# Patient Record
Sex: FEMALE | NOT HISPANIC OR LATINO | URBAN - METROPOLITAN AREA
[De-identification: names, ages, dates, MRNs, and addresses within clinical notes are randomized per-mention and may not be internally consistent; named-entity substitution may affect disease eponyms.]

---

## 2018-12-17 ENCOUNTER — EMERGENCY (EMERGENCY)
Facility: HOSPITAL | Age: 38
LOS: 1 days | Discharge: HOME | End: 2018-12-17
Attending: EMERGENCY MEDICINE | Admitting: EMERGENCY MEDICINE
Payer: MEDICAID

## 2018-12-17 VITALS
OXYGEN SATURATION: 100 % | TEMPERATURE: 98 F | HEIGHT: 69 IN | HEART RATE: 83 BPM | SYSTOLIC BLOOD PRESSURE: 153 MMHG | RESPIRATION RATE: 15 BRPM | WEIGHT: 149.91 LBS | DIASTOLIC BLOOD PRESSURE: 95 MMHG

## 2018-12-17 LAB
ALBUMIN SERPL ELPH-MCNC: 4.5 G/DL — SIGNIFICANT CHANGE UP (ref 3.3–5)
ALP SERPL-CCNC: 35 U/L — LOW (ref 40–120)
ALT FLD-CCNC: 22 U/L — SIGNIFICANT CHANGE UP (ref 12–78)
ANION GAP SERPL CALC-SCNC: 10 MMOL/L — SIGNIFICANT CHANGE UP (ref 5–17)
AST SERPL-CCNC: 18 U/L — SIGNIFICANT CHANGE UP (ref 15–37)
BASOPHILS # BLD AUTO: 0.03 K/UL — SIGNIFICANT CHANGE UP (ref 0–0.2)
BASOPHILS NFR BLD AUTO: 0.3 % — SIGNIFICANT CHANGE UP (ref 0–2)
BILIRUB SERPL-MCNC: 1 MG/DL — SIGNIFICANT CHANGE UP (ref 0.2–1.2)
BUN SERPL-MCNC: 19 MG/DL — SIGNIFICANT CHANGE UP (ref 7–23)
CALCIUM SERPL-MCNC: 8.6 MG/DL — SIGNIFICANT CHANGE UP (ref 8.5–10.1)
CHLORIDE SERPL-SCNC: 108 MMOL/L — SIGNIFICANT CHANGE UP (ref 96–108)
CO2 SERPL-SCNC: 23 MMOL/L — SIGNIFICANT CHANGE UP (ref 22–31)
CREAT SERPL-MCNC: 0.76 MG/DL — SIGNIFICANT CHANGE UP (ref 0.5–1.3)
EOSINOPHIL # BLD AUTO: 0.03 K/UL — SIGNIFICANT CHANGE UP (ref 0–0.5)
EOSINOPHIL NFR BLD AUTO: 0.3 % — SIGNIFICANT CHANGE UP (ref 0–6)
GLUCOSE SERPL-MCNC: 94 MG/DL — SIGNIFICANT CHANGE UP (ref 70–99)
HCG SERPL-ACNC: <1 MIU/ML — SIGNIFICANT CHANGE UP
HCT VFR BLD CALC: 41 % — SIGNIFICANT CHANGE UP (ref 34.5–45)
HGB BLD-MCNC: 13.8 G/DL — SIGNIFICANT CHANGE UP (ref 11.5–15.5)
IMM GRANULOCYTES NFR BLD AUTO: 0.2 % — SIGNIFICANT CHANGE UP (ref 0–1.5)
LYMPHOCYTES # BLD AUTO: 1.24 K/UL — SIGNIFICANT CHANGE UP (ref 1–3.3)
LYMPHOCYTES # BLD AUTO: 14.1 % — SIGNIFICANT CHANGE UP (ref 13–44)
MCHC RBC-ENTMCNC: 32.5 PG — SIGNIFICANT CHANGE UP (ref 27–34)
MCHC RBC-ENTMCNC: 33.7 GM/DL — SIGNIFICANT CHANGE UP (ref 32–36)
MCV RBC AUTO: 96.7 FL — SIGNIFICANT CHANGE UP (ref 80–100)
MONOCYTES # BLD AUTO: 0.45 K/UL — SIGNIFICANT CHANGE UP (ref 0–0.9)
MONOCYTES NFR BLD AUTO: 5.1 % — SIGNIFICANT CHANGE UP (ref 2–14)
NEUTROPHILS # BLD AUTO: 7 K/UL — SIGNIFICANT CHANGE UP (ref 1.8–7.4)
NEUTROPHILS NFR BLD AUTO: 80 % — HIGH (ref 43–77)
PLATELET # BLD AUTO: 252 K/UL — SIGNIFICANT CHANGE UP (ref 150–400)
POTASSIUM SERPL-MCNC: 3 MMOL/L — LOW (ref 3.5–5.3)
POTASSIUM SERPL-SCNC: 3 MMOL/L — LOW (ref 3.5–5.3)
PROT SERPL-MCNC: 7.9 G/DL — SIGNIFICANT CHANGE UP (ref 6–8.3)
RBC # BLD: 4.24 M/UL — SIGNIFICANT CHANGE UP (ref 3.8–5.2)
RBC # FLD: 12.3 % — SIGNIFICANT CHANGE UP (ref 10.3–14.5)
SODIUM SERPL-SCNC: 141 MMOL/L — SIGNIFICANT CHANGE UP (ref 135–145)
WBC # BLD: 8.77 K/UL — SIGNIFICANT CHANGE UP (ref 3.8–10.5)
WBC # FLD AUTO: 8.77 K/UL — SIGNIFICANT CHANGE UP (ref 3.8–10.5)

## 2018-12-17 PROCEDURE — 96375 TX/PRO/DX INJ NEW DRUG ADDON: CPT

## 2018-12-17 PROCEDURE — 99284 EMERGENCY DEPT VISIT MOD MDM: CPT | Mod: 25

## 2018-12-17 PROCEDURE — 70450 CT HEAD/BRAIN W/O DYE: CPT

## 2018-12-17 PROCEDURE — 84702 CHORIONIC GONADOTROPIN TEST: CPT

## 2018-12-17 PROCEDURE — 70450 CT HEAD/BRAIN W/O DYE: CPT | Mod: 26

## 2018-12-17 PROCEDURE — 96374 THER/PROPH/DIAG INJ IV PUSH: CPT

## 2018-12-17 PROCEDURE — 85027 COMPLETE CBC AUTOMATED: CPT

## 2018-12-17 PROCEDURE — 99285 EMERGENCY DEPT VISIT HI MDM: CPT

## 2018-12-17 PROCEDURE — 36415 COLL VENOUS BLD VENIPUNCTURE: CPT

## 2018-12-17 PROCEDURE — 80053 COMPREHEN METABOLIC PANEL: CPT

## 2018-12-17 RX ORDER — METOCLOPRAMIDE HCL 10 MG
10 TABLET ORAL ONCE
Qty: 0 | Refills: 0 | Status: COMPLETED | OUTPATIENT
Start: 2018-12-17 | End: 2018-12-17

## 2018-12-17 RX ORDER — KETOROLAC TROMETHAMINE 30 MG/ML
30 SYRINGE (ML) INJECTION ONCE
Qty: 0 | Refills: 0 | Status: DISCONTINUED | OUTPATIENT
Start: 2018-12-17 | End: 2018-12-17

## 2018-12-17 RX ORDER — SODIUM CHLORIDE 9 MG/ML
1000 INJECTION INTRAMUSCULAR; INTRAVENOUS; SUBCUTANEOUS ONCE
Qty: 0 | Refills: 0 | Status: COMPLETED | OUTPATIENT
Start: 2018-12-17 | End: 2018-12-17

## 2018-12-17 RX ORDER — POTASSIUM CHLORIDE 20 MEQ
40 PACKET (EA) ORAL ONCE
Qty: 0 | Refills: 0 | Status: COMPLETED | OUTPATIENT
Start: 2018-12-17 | End: 2018-12-17

## 2018-12-17 RX ADMIN — SODIUM CHLORIDE 1000 MILLILITER(S): 9 INJECTION INTRAMUSCULAR; INTRAVENOUS; SUBCUTANEOUS at 18:43

## 2018-12-17 RX ADMIN — Medication 40 MILLIEQUIVALENT(S): at 18:51

## 2018-12-17 RX ADMIN — Medication 10 MILLIGRAM(S): at 18:43

## 2018-12-17 RX ADMIN — Medication 30 MILLIGRAM(S): at 18:43

## 2018-12-17 NOTE — ED ADULT NURSE NOTE - NSIMPLEMENTINTERV_GEN_ALL_ED
Implemented All Universal Safety Interventions:  [place] to call system. Call bell, personal items and telephone within reach. Instruct patient to call for assistance. Room bathroom lighting operational. Non-slip footwear when patient is off stretcher. Physically safe environment: no spills, clutter or unnecessary equipment. Stretcher in lowest position, wheels locked, appropriate side rails in place.

## 2018-12-17 NOTE — ED PROVIDER NOTE - OBJECTIVE STATEMENT
37 yo female with no pmhx c/o pain to right side of head. Patient daily pain x 2 months. also reports swelling to right posterior neck which increases and decreases in size. denies nausea or vomiting. denies visual changes  denies cp or sob  denies abd pain  lmp current

## 2019-07-19 ENCOUNTER — OTHER (OUTPATIENT)
Age: 39
End: 2019-07-19

## 2019-07-19 ENCOUNTER — APPOINTMENT (OUTPATIENT)
Dept: ORTHOPEDIC SURGERY | Facility: CLINIC | Age: 39
End: 2019-07-19
Payer: COMMERCIAL

## 2019-07-19 VITALS
SYSTOLIC BLOOD PRESSURE: 116 MMHG | HEIGHT: 69 IN | HEART RATE: 57 BPM | DIASTOLIC BLOOD PRESSURE: 73 MMHG | WEIGHT: 150 LBS | BODY MASS INDEX: 22.22 KG/M2

## 2019-07-19 PROCEDURE — 99213 OFFICE O/P EST LOW 20 MIN: CPT

## 2019-07-19 PROCEDURE — 73080 X-RAY EXAM OF ELBOW: CPT | Mod: LT

## 2019-07-19 NOTE — ASSESSMENT
[FreeTextEntry1] : Patient with right medial sided elbow pain consistent with medial epicondylitis. The nature of this condition was described at length with the patient she verbalized understanding. I recommended a wrist brace for activities to rest the tendon at the elbow. Meloxicam for an anti-inflammatory. Physical therapy for stretching and gliding exercises. I will send the patient for an EMG to workup her ulnar nerve paresthesias. The patient will follow up in 4-6 weeks should her symptoms not improve and to go over the results of her EMG. The patient is in agreement with this plan.

## 2019-07-19 NOTE — PHYSICAL EXAM
[de-identified] : Right elbow exam\par \par Skin: Clean, dry, intact. No ecchymosis. No swelling. No palpable joint effusion.\par ROM: RIGHT full flexion and extension, full supination/pronation.  LEFT full flexion and extension, full supination/pronation.\par Painful ROM: None\par Tenderness: Mild medial epicondyle pain. No lateral epicondyle pain. No olecranon pain. No pain at radial head. Positive pain at the medial aspect of the elbow with flexion and extension against resistance.\par Strength: 5/5 elbow flexion, 5/5 elbow extension, 5/5 supination, 5/5 pronation\par Stability: Stable to vaus/valgus stress\par Vasc: 2+ radial pulse, <2s cap refill\par Sensation: Intact to light touch throughout\par Neuro: Positive tinels at ulnar canal, AIN/PIN/Ulnar nerve intact to motor/sensation.\par \par  [de-identified] : 3 x-ray views of the right elbow were obtained. No fractures or dislocations are noted.

## 2019-07-19 NOTE — HISTORY OF PRESENT ILLNESS
[FreeTextEntry1] : 07/19/2019: Patient is a 38-year-old right-hand-dominant female who presents the office today with medial sided right elbow pain has been present for the past 6 months. Depending on movements the pain can be done or sharp. It is better with rest and worse with activities. She has tried Advil and Aleve with no improvement. Sometimes the pain radiates down the hands to the fingers. She does complain of some intermittent ulnar nerve distribution paresthesias. There is no specific injury the patient can point to, however, she does go to the gym off in about 3-5 days a week.

## 2019-08-05 ENCOUNTER — APPOINTMENT (OUTPATIENT)
Dept: ORTHOPEDIC SURGERY | Facility: CLINIC | Age: 39
End: 2019-08-05

## 2019-09-16 ENCOUNTER — APPOINTMENT (OUTPATIENT)
Dept: ORTHOPEDIC SURGERY | Facility: CLINIC | Age: 39
End: 2019-09-16
Payer: MEDICAID

## 2019-09-16 DIAGNOSIS — S66.912A STRAIN OF UNSPECIFIED MUSCLE, FASCIA AND TENDON AT WRIST AND HAND LEVEL, LEFT HAND, INITIAL ENCOUNTER: ICD-10-CM

## 2019-09-16 DIAGNOSIS — G56.21 LESION OF ULNAR NERVE, RIGHT UPPER LIMB: ICD-10-CM

## 2019-09-16 PROCEDURE — 73110 X-RAY EXAM OF WRIST: CPT | Mod: LT

## 2019-09-16 PROCEDURE — 99213 OFFICE O/P EST LOW 20 MIN: CPT

## 2019-09-16 PROCEDURE — 73080 X-RAY EXAM OF ELBOW: CPT | Mod: RT

## 2019-09-16 NOTE — ASSESSMENT
[FreeTextEntry1] : the patient is a 38-year-old female with right medial elbow pain, decreased sensation in the middle and ring fingers as well as recent onset left ulnar-sided wrist pain. I recommend MRI and EMG of the right elbow as well as anti-inflammatory medication. She will consider following up with a rheumatologist to rule out systemic inflammatory disorder. She will followup to review the results of the EMG and MRI.

## 2019-09-16 NOTE — PHYSICAL EXAM
[de-identified] : Right elbow exam\par \par Skin: Clean, dry, intact. No ecchymosis. No swelling. No palpable joint effusion.\par ROM: RIGHT full flexion and extension, full supination/pronation.  LEFT full flexion and extension, full supination/pronation.\par Painful ROM: None\par Tenderness: Mild medial epicondyle pain. No lateral epicondyle pain. No olecranon pain. No pain at radial head. Positive pain at the medial aspect of the elbow with flexion and extension against resistance. mild tenderness to palpation at the fovea of the left wrist with mild pain with dart-throwers motion\par Strength: 5/5 elbow flexion, 5/5 elbow extension, 5/5 supination, 5/5 pronation\par Stability: Stable to vaus/valgus stress\par Vasc: 2+ radial pulse, <2s cap refill\par Sensation: Intact to light touch throughout\par Neuro: Positive tinels at cubital tunnel, negative Tinel at the wrist, AIN/PIN/Ulnar nerve intact to motor/sensation.\par \par  [de-identified] : 3 x-ray views of the right elbow were obtained. No fractures or dislocations are noted.

## 2019-09-16 NOTE — HISTORY OF PRESENT ILLNESS
[FreeTextEntry1] : the patient returns today for followup of her right medial elbow pain. She has been going for physical therapy and has had a cortisone injection in the region of the cubital tunnel which did not provide her any relief of her symptoms. She continues to have "altered sensation" in the middle and ring fingers. She denies paresthesias in the small finger. Additionally she complains of a dull pain at the ulnar aspect of the left wrist, she denies trauma or inciting event. Her symptoms were slightly improved with rest.

## 2019-09-19 ENCOUNTER — APPOINTMENT (OUTPATIENT)
Dept: ORTHOPEDIC SURGERY | Facility: CLINIC | Age: 39
End: 2019-09-19
Payer: MEDICAID

## 2019-09-19 DIAGNOSIS — M25.561 PAIN IN RIGHT KNEE: ICD-10-CM

## 2019-09-19 DIAGNOSIS — M23.91 UNSPECIFIED INTERNAL DERANGEMENT OF RIGHT KNEE: ICD-10-CM

## 2019-09-19 PROCEDURE — 73562 X-RAY EXAM OF KNEE 3: CPT | Mod: RT

## 2019-09-19 PROCEDURE — 99214 OFFICE O/P EST MOD 30 MIN: CPT

## 2019-09-19 NOTE — HISTORY OF PRESENT ILLNESS
[de-identified] : 38 year old female presenting with right knee pain. The patient’s pain is noted to be a 4/10. The pain began around 4 months ago. The patient describes their pain as constant and dull. She also notes some clicking. The patient denies pain to other joints. She also lifts weights.She is currently taking no pain medication. No other complaints at this time.\par \par \par

## 2019-09-19 NOTE — PHYSICAL EXAM
[de-identified] : General: Alert and oriented x3. In no acute distress. Pleasant in nature with a normal affect. No apparent respiratory distress.\par R Knee Exam\par Skin: Clean, dry, intact\par Inspection: No obvious malalignment, no masses, no swelling, no effusion\par Pulses: 2+ DP/PT pulses\par ROM: R Knee 0-140 degrees of flexion. No pain with deep knee flexion.\par Tenderness: No MJLT. No LJLT. No pain over the patella facets. No pain to the quadriceps mechanism.\par Stability: Stable to varus, valgus, lachman testing. Negative anterior/posterior drawer.\par Strength: 5/5 Q/H/TA/GS/EHL, no atrophy\par Neuro: In tact to light touch throughout, DTR's normal\par Additional tests: Negative McMurrays test, Negative patellar grind test. [de-identified] : 3V of the right knee were ordered obtained and reviewed by me today, 09/19/2019 , revealed: no fx.\par \par \par

## 2019-09-19 NOTE — ADDENDUM
[FreeTextEntry1] : I, Enmanuel Kelsy, acted solely as a scribe for Dr. Ariel Hunt on this date 09/19/2019 .\par All medical record entries made by the Scribe were at my, Dr. Ariel Hunt, direction and personally dictated by me on 09/19/2019 . I have reviewed the chart and agree that the record accurately reflects my personal performance of the history, physical exam, assessment and plan. I have also personally directed, reviewed, and agreed with the chart.\par \par \par

## 2019-09-19 NOTE — DISCUSSION/SUMMARY
[de-identified] : Today I had a lengthy discussion with the patient regarding their right knee pain.I have addressed all the patient's concerns surrounding the pathology of their condition. I recommend the patient utilize meloxicam and/or a knee sleeve. At this time I would like to obtain advanced imaging of the patient's right knee. An MRI was ordered so I can find out more about the etiology of the patient's condition. The patient should follow up with the office after obtaining the MRI. The patient understood and verbally agreed to the treatment plan. All of their questions were answered and they were satisfied with the visit. The patient should call the office if they have any questions or experience worsening symptoms.\par \par \par

## 2019-09-19 NOTE — CONSULT LETTER
[Consult Letter:] : I had the pleasure of evaluating your patient, [unfilled]. [Please see my note below.] : Please see my note below. [Consult Closing:] : Thank you very much for allowing me to participate in the care of this patient.  If you have any questions, please do not hesitate to contact me. [Sincerely,] : Sincerely, [FreeTextEntry3] : Ariel Hunt, DO\par Foot and Ankle Surgery\par

## 2019-09-24 ENCOUNTER — FORM ENCOUNTER (OUTPATIENT)
Age: 39
End: 2019-09-24

## 2019-09-25 ENCOUNTER — APPOINTMENT (OUTPATIENT)
Dept: MRI IMAGING | Facility: CLINIC | Age: 39
End: 2019-09-25

## 2019-09-25 ENCOUNTER — OUTPATIENT (OUTPATIENT)
Dept: OUTPATIENT SERVICES | Facility: HOSPITAL | Age: 39
LOS: 1 days | End: 2019-09-25
Payer: MEDICAID

## 2019-09-25 DIAGNOSIS — M25.561 PAIN IN RIGHT KNEE: ICD-10-CM

## 2019-09-25 DIAGNOSIS — G56.21 LESION OF ULNAR NERVE, RIGHT UPPER LIMB: ICD-10-CM

## 2019-09-25 DIAGNOSIS — M77.01 MEDIAL EPICONDYLITIS, RIGHT ELBOW: ICD-10-CM

## 2019-09-25 DIAGNOSIS — M23.91 UNSPECIFIED INTERNAL DERANGEMENT OF RIGHT KNEE: ICD-10-CM

## 2019-09-25 PROCEDURE — 73721 MRI JNT OF LWR EXTRE W/O DYE: CPT | Mod: 26,RT

## 2019-09-25 PROCEDURE — 73721 MRI JNT OF LWR EXTRE W/O DYE: CPT

## 2019-09-25 PROCEDURE — 73221 MRI JOINT UPR EXTREM W/O DYE: CPT

## 2019-09-25 PROCEDURE — 73221 MRI JOINT UPR EXTREM W/O DYE: CPT | Mod: 26,RT

## 2019-10-17 ENCOUNTER — APPOINTMENT (OUTPATIENT)
Dept: NEUROLOGY | Facility: CLINIC | Age: 39
End: 2019-10-17
Payer: MEDICAID

## 2019-10-17 DIAGNOSIS — M54.12 RADICULOPATHY, CERVICAL REGION: ICD-10-CM

## 2019-10-17 PROCEDURE — 95910 NRV CNDJ TEST 7-8 STUDIES: CPT

## 2019-10-17 PROCEDURE — 95886 MUSC TEST DONE W/N TEST COMP: CPT

## 2019-10-17 RX ORDER — MELOXICAM 15 MG/1
15 TABLET ORAL
Qty: 30 | Refills: 1 | Status: DISCONTINUED | COMMUNITY
Start: 2019-07-19 | End: 2019-10-17

## 2019-10-17 RX ORDER — METHYLPREDNISOLONE 4 MG/1
4 TABLET ORAL
Qty: 1 | Refills: 0 | Status: DISCONTINUED | COMMUNITY
Start: 2019-09-18 | End: 2019-10-17

## 2019-10-17 NOTE — REASON FOR VISIT
[Procedure: _________] : a [unfilled] procedure visit [FreeTextEntry1] : Referred by Dr. Bueno for EMG/MCV studies of upper extremity for evaluation of ulnar neuropathy

## 2019-10-17 NOTE — PROCEDURE
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 There is no evidence of Ulnar neuropathy at the elbows on either side.\par \par In addition, the studies raise possibility of C5/6 radiculopathy on the right side.

## 2020-03-02 ENCOUNTER — APPOINTMENT (OUTPATIENT)
Dept: ORTHOPEDIC SURGERY | Facility: CLINIC | Age: 40
End: 2020-03-02
Payer: MEDICAID

## 2020-03-02 DIAGNOSIS — M77.01 MEDIAL EPICONDYLITIS, RIGHT ELBOW: ICD-10-CM

## 2020-03-02 DIAGNOSIS — G56.01 CARPAL TUNNEL SYNDROME, RIGHT UPPER LIMB: ICD-10-CM

## 2020-03-02 PROCEDURE — 99213 OFFICE O/P EST LOW 20 MIN: CPT

## 2020-03-02 NOTE — HISTORY OF PRESENT ILLNESS
[FreeTextEntry1] : 9/16/19: the patient returns today for followup of her right medial elbow pain. She has been going for physical therapy and has had a cortisone injection in the region of the cubital tunnel which did not provide her any relief of her symptoms. She continues to have "altered sensation" in the middle and ring fingers. She denies paresthesias in the small finger. Additionally she complains of a dull pain at the ulnar aspect of the left wrist, she denies trauma or inciting event. Her symptoms were slightly improved with rest.\par \par 03/02/2020: The patient returns to the office with continued right medial elbow pain. She also complains of mild intermittent paresthesias in the median nerve distribution still. She notes that her medial sided right elbow pain has been getting worse over the past month or 2. She also notes a small amount of ecchymosis over the medial side of her elbow.

## 2020-03-02 NOTE — PHYSICAL EXAM
Polymyxin B 1 drop every 6 hours to left eye if should start draining mucous from eyes or crusted shut in the morning  No rubbing eyes  Practice good hand hygiene  May apply warm compress to affected eye   Follow up with your Pediatrician if no improvement in symptoms  If any worsening symptoms or change in vision go to the emergency room               [Normal Finger/nose] : finger to nose coordination [Normal] : no peripheral adenopathy appreciated [de-identified] : Right elbow exam\par \par Skin: Clean, dry, intact. No ecchymosis. No swelling. No palpable joint effusion.\par ROM: RIGHT full flexion and extension, full supination/pronation.  LEFT full flexion and extension, full supination/pronation.\par Painful ROM: None\par Tenderness: Mild medial epicondyle pain. No lateral epicondyle pain. No olecranon pain. No pain at radial head. Positive pain at the medial aspect of the elbow with flexion and extension against resistance. mild tenderness to palpation at the fovea of the left wrist with mild pain with dart-throwers motion\par Strength: 5/5 elbow flexion, 5/5 elbow extension, 5/5 supination, 5/5 pronation\par Stability: Stable to vaus/valgus stress\par Vasc: 2+ radial pulse, <2s cap refill\par Sensation: Intact to light touch throughout\par Neuro: negative tinels at cubital tunnel, negative Tinel at the wrist, AIN/PIN/Ulnar nerve intact to motor/sensation.\par \par  [de-identified] : KANAR

## 2020-03-02 NOTE — REVIEW OF SYSTEMS
GI Discharge Instructions Endoscopy      5/15/2019    During your exam, the physician:    Completed a thorough exam, no specimens were obtained.    DIET INSTRUCTIONS:  Resume your regular diet    PRESCRIPTIONS/MEDICATIONS  No new prescriptions given today    A RESPONSIBLE ADULT MUST ACCOMPANY YOU AND DRIVE YOU HOME    You had the following procedure(s) today:   Other: Flexible sigmoidoscopy     There is no need to hold any aspirin or anti-inflammatory medications.     Following sedation, your judgement, perception and coordination are impaired for a minimum of       24 hours.      Therefore:  · Do not drive.  Do not return to work today.  · Do not operate appliances or machinery that require quick reaction time  · Do not sign legal documents or be involved in work decisions  · Do not smoke or drink alcoholic beverages for 24 hours  · Plan to spend a few hours resting before resuming your normal routine    Please call your physician in the event that you experience any of the following or proceed to     the nearest hospital in the event of an emergency:     SIGMOIDOSCOPY  Fever  Severe abdominal distention or pain. Some mild distention and/or cramping are normal after these procedures but should pass within an hour or two with the passage of air.  Rectal bleeding more than blood streaking on the toilet  tissue  Nausea or Vomiting    If you have any questions or concerns, contact Dr. VANNA Beyer, [phone]    ADDITIONAL INSTRUCTIONS:      After flexible sigmoidoscopy you are to report to radiology for a pouchogram.  This is to be preformed so that you are all ready for your surgical appointment.     [Joint Pain] : joint pain [Negative] : Allergic/Immunologic [FreeTextEntry9] : Right elbow pain

## 2020-03-02 NOTE — ASSESSMENT
[FreeTextEntry1] : Patient with continued symptoms of epicondylitis of the right elbow. I will send the patient Toradol for a stronger anti-inflammatory. I also recommend a new MRI as she has had worsening in her symptoms as well as ecchymosis over the medial aspect of her elbow. She will follow back up after the MRI to discuss the results and further treatment options. The patient is in agreement with this plan.

## 2020-03-23 ENCOUNTER — APPOINTMENT (OUTPATIENT)
Dept: ORTHOPEDIC SURGERY | Facility: CLINIC | Age: 40
End: 2020-03-23

## 2021-02-02 ENCOUNTER — APPOINTMENT (OUTPATIENT)
Dept: CARDIOLOGY | Facility: CLINIC | Age: 41
End: 2021-02-02
Payer: MEDICAID

## 2021-02-02 ENCOUNTER — NON-APPOINTMENT (OUTPATIENT)
Age: 41
End: 2021-02-02

## 2021-02-02 VITALS
HEART RATE: 61 BPM | WEIGHT: 154 LBS | HEIGHT: 69 IN | DIASTOLIC BLOOD PRESSURE: 92 MMHG | BODY MASS INDEX: 22.81 KG/M2 | OXYGEN SATURATION: 99 % | SYSTOLIC BLOOD PRESSURE: 143 MMHG

## 2021-02-02 DIAGNOSIS — Z82.49 FAMILY HISTORY OF ISCHEMIC HEART DISEASE AND OTHER DISEASES OF THE CIRCULATORY SYSTEM: ICD-10-CM

## 2021-02-02 DIAGNOSIS — Z78.9 OTHER SPECIFIED HEALTH STATUS: ICD-10-CM

## 2021-02-02 DIAGNOSIS — R00.2 PALPITATIONS: ICD-10-CM

## 2021-02-02 PROCEDURE — 99204 OFFICE O/P NEW MOD 45 MIN: CPT

## 2021-02-02 PROCEDURE — 93000 ELECTROCARDIOGRAM COMPLETE: CPT

## 2021-02-02 PROCEDURE — 99072 ADDL SUPL MATRL&STAF TM PHE: CPT

## 2021-02-02 NOTE — PHYSICAL EXAM
[General Appearance - Well Developed] : well developed [Normal Appearance] : normal appearance [Well Groomed] : well groomed [General Appearance - Well Nourished] : well nourished [General Appearance - In No Acute Distress] : no acute distress [No Deformities] : no deformities [Normal Conjunctiva] : the conjunctiva exhibited no abnormalities [Eyelids - No Xanthelasma] : the eyelids demonstrated no xanthelasmas [Normal Oral Mucosa] : normal oral mucosa [No Oral Pallor] : no oral pallor [No Oral Cyanosis] : no oral cyanosis [Normal Jugular Venous A Waves Present] : normal jugular venous A waves present [Normal Jugular Venous V Waves Present] : normal jugular venous V waves present [No Jugular Venous Hall A Waves] : no jugular venous hall A waves [Normal Rate] : normal [Normal S1] : normal S1 [Normal S2] : normal S2 [S3] : no S3 [S4] : no S4 [No Murmur] : no murmurs heard [Right Carotid Bruit] : no bruit heard over the right carotid [Left Carotid Bruit] : no bruit heard over the left carotid [Left Femoral Bruit] : no bruit heard over the left femoral artery [Right Femoral Bruit] : no bruit heard over the right femoral artery [2+] : left 2+ [No Abnormalities] : the abdominal aorta was not enlarged and no bruit was heard [No Pitting Edema] : no pitting edema present [Respiration, Rhythm And Depth] : normal respiratory rhythm and effort [Exaggerated Use Of Accessory Muscles For Inspiration] : no accessory muscle use [Auscultation Breath Sounds / Voice Sounds] : lungs were clear to auscultation bilaterally [Abdomen Tenderness] : non-tender [Abdomen Soft] : soft [Abdomen Mass (___ Cm)] : no abdominal mass palpated [Abnormal Walk] : normal gait [Gait - Sufficient For Exercise Testing] : the gait was sufficient for exercise testing [Nail Clubbing] : no clubbing of the fingernails [Cyanosis, Localized] : no localized cyanosis [Petechial Hemorrhages (___cm)] : no petechial hemorrhages [Skin Color & Pigmentation] : normal skin color and pigmentation [] : no rash [No Venous Stasis] : no venous stasis [No Skin Ulcers] : no skin ulcer [Skin Lesions] : no skin lesions [No Xanthoma] : no  xanthoma was observed [Oriented To Time, Place, And Person] : oriented to person, place, and time [Affect] : the affect was normal [Mood] : the mood was normal [No Anxiety] : not feeling anxious

## 2021-02-02 NOTE — HISTORY OF PRESENT ILLNESS
[FreeTextEntry1] : Salome is a 40 year old female here for evaluation.\par She is generally healthy. She has a history of psoriasis/arthritis, and is following with a rheumatologist. \par Cardiac wise, she has a history of palpitations, that occur every few months, and resolve within seconds. She is active and lifts without issue.\par She denies chest pain and difficulty breathing.\par She notes intermittent swelling of her hands and lower extremities. Recently, this has been worse on the right side of her body. She eats a lot of salt in her diet.\par There is a family history of CAD/CHF in her mother. She is not a smoker.

## 2021-02-02 NOTE — DISCUSSION/SUMMARY
[FreeTextEntry1] : Salome is a 40 year old female here for evaluation. She reports edema, that has recently resolved. She also has intermittent palpitations over years.\par Her physical exam is unremarkable. Her EKG is a SR without obvious ischemia or chamber enlargement. Her BP is mildly elevated, though improved on repeat evaluation.\par We will start with an echocardiogram to confirm the absence of structural heart disease. She needs to stay active and to go out of her way to limit her salt intake.\par We will speak after the echocardiogram and arrange follow up.

## 2021-02-23 ENCOUNTER — APPOINTMENT (OUTPATIENT)
Dept: CARDIOLOGY | Facility: CLINIC | Age: 41
End: 2021-02-23

## 2021-11-03 ENCOUNTER — NON-APPOINTMENT (OUTPATIENT)
Age: 41
End: 2021-11-03

## 2022-02-08 ENCOUNTER — APPOINTMENT (OUTPATIENT)
Dept: INTERNAL MEDICINE | Facility: CLINIC | Age: 42
End: 2022-02-08
Payer: MEDICAID

## 2022-02-08 ENCOUNTER — NON-APPOINTMENT (OUTPATIENT)
Age: 42
End: 2022-02-08

## 2022-02-08 VITALS
SYSTOLIC BLOOD PRESSURE: 130 MMHG | DIASTOLIC BLOOD PRESSURE: 88 MMHG | HEART RATE: 76 BPM | OXYGEN SATURATION: 98 % | RESPIRATION RATE: 16 BRPM | BODY MASS INDEX: 23.42 KG/M2 | HEIGHT: 69 IN | TEMPERATURE: 98.1 F | WEIGHT: 158.13 LBS

## 2022-02-08 DIAGNOSIS — Z83.3 FAMILY HISTORY OF DIABETES MELLITUS: ICD-10-CM

## 2022-02-08 DIAGNOSIS — Z00.00 ENCOUNTER FOR GENERAL ADULT MEDICAL EXAMINATION W/OUT ABNORMAL FINDINGS: ICD-10-CM

## 2022-02-08 DIAGNOSIS — E63.9 NUTRITIONAL DEFICIENCY, UNSPECIFIED: ICD-10-CM

## 2022-02-08 DIAGNOSIS — Z82.49 FAMILY HISTORY OF ISCHEMIC HEART DISEASE AND OTHER DISEASES OF THE CIRCULATORY SYSTEM: ICD-10-CM

## 2022-02-08 PROCEDURE — G0442 ANNUAL ALCOHOL SCREEN 15 MIN: CPT

## 2022-02-08 PROCEDURE — 99386 PREV VISIT NEW AGE 40-64: CPT | Mod: 25

## 2022-02-08 PROCEDURE — 93000 ELECTROCARDIOGRAM COMPLETE: CPT | Mod: 59

## 2022-02-08 RX ORDER — KETOROLAC TROMETHAMINE 10 MG/1
10 TABLET, FILM COATED ORAL 3 TIMES DAILY
Qty: 15 | Refills: 0 | Status: DISCONTINUED | COMMUNITY
Start: 2020-03-02 | End: 2022-02-08

## 2022-02-08 RX ORDER — AZELASTINE HYDROCHLORIDE 137 UG/1
0.1 SPRAY, METERED NASAL
Qty: 30 | Refills: 0 | Status: ACTIVE | COMMUNITY
Start: 2021-12-01

## 2022-02-08 NOTE — PLAN
[FreeTextEntry1] : ENT/Immunology\par seasonal allergies - continue Claritin p.o. as directed\par Gynecology\par Results of pap smear and most recent breast imaging to be requested from GYN, Dr. Pathak's office\par Immunization\par Tdap (Adacel) Vaccine - discussed, patient to consider and follow up for vaccine administration if interested\par S/p COVID-19 Vaccine (Pfizer, x2) - recommended following up at pharmacy for booster dose of vaccine\par \par check EKG (results as above)\par Rx given for blood work (female panel, hemoglobin A1C, vitamin panel, and FSH); recommended following up at a [facility].

## 2022-02-08 NOTE — ASSESSMENT
[FreeTextEntry1] : New patient is a 41 year old female with a past medical history as above who presents for an annual wellness visit.\par \par

## 2022-02-08 NOTE — HISTORY OF PRESENT ILLNESS
[FreeTextEntry1] : new patient annual wellness visit [de-identified] : New patient is a 41 year old female with a past medical history as below who presents for an annual wellness visit. Patient is not currently taking any prescription medications. She takes OTC Claritin for seasonal allergy symptoms. She also takes an OTC Multivitamin. Patient has seen her GYN, Dr. Pathak in the past year for her annual visit. Patient denies any issues with vision and hearing. Patient states she has not been maintaining a well-balanced diet. Patient received the flu vaccine for this season in October 2021. She is unsure if she has received the Tetanus Vaccine in the past 10 years. Patient has received 2 doses of the COVID-19 Vaccine (Pfizer).  Patient is not fasting today.\par

## 2022-02-08 NOTE — HEALTH RISK ASSESSMENT
[Never] : Never [Yes] : Yes [2 - 3 times a week (3 pts)] : 2 - 3  times a week (3 points) [1 or 2 (0 pts)] : 1 or 2 (0 points) [Never (0 pts)] : Never (0 points) [No] : In the past 12 months have you used drugs other than those required for medical reasons? No [0] : 2) Feeling down, depressed, or hopeless: Not at all (0) [PHQ-2 Negative - No further assessment needed] : PHQ-2 Negative - No further assessment needed [Audit-CScore] : 3 [[id number]] : 0 [Reports changes in hearing] : Reports no changes in hearing [Reports changes in vision] : Reports no changes in vision [MammogramComments] : Results to be requested.  [PapSmearComments] : Results to be requested from GYN, Dr. Pathak' office.

## 2022-02-08 NOTE — ADDENDUM
[FreeTextEntry1] : I, Jayro Al, acted solely as scribe for Dr. Ariel Graves DO on this date 02/08/2022  4:00PM .\par \par All medical record entries made by the Scribe were at my, Dr. Ariel Graves DO direction and personally dictated by me on 02/08/2022  4:00PM. I have reviewed the chart and agree that the record accurately reflects my personal performance of the history, physical exam, assessment and plan. I have also personally directed, reviewed and agreed with the chart.\par

## 2022-02-27 ENCOUNTER — NON-APPOINTMENT (OUTPATIENT)
Age: 42
End: 2022-02-27

## 2022-02-27 LAB
24R-OH-CALCIDIOL SERPL-MCNC: 37.9 PG/ML
25(OH)D3 SERPL-MCNC: 34.7 NG/ML
FSH SERPL-MCNC: 4 IU/L
HCT VFR BLD CALC: 44.6 %
HGB BLD-MCNC: 14.1 G/DL
IRON SERPL-MCNC: 176 UG/DL
MAGNESIUM SERPL-MCNC: 2.1 MG/DL
POTASSIUM SERPL-SCNC: 4.1 MMOL/L
UBIQUINONE10 SERPL-MCNC: 0.86 UG/ML
VIT B12 SERPL-MCNC: 440 PG/ML
VIT C SERPL-MCNC: 0.2 MG/DL

## 2022-02-28 LAB — VIT A SERPL-MCNC: 56.3 UG/DL

## 2022-03-06 LAB — VIT B1 SERPL-MCNC: 118.5 NMOL/L

## 2022-03-23 ENCOUNTER — LABORATORY RESULT (OUTPATIENT)
Age: 42
End: 2022-03-23

## 2022-03-27 ENCOUNTER — NON-APPOINTMENT (OUTPATIENT)
Age: 42
End: 2022-03-27

## 2022-05-03 ENCOUNTER — APPOINTMENT (OUTPATIENT)
Dept: INTERNAL MEDICINE | Facility: CLINIC | Age: 42
End: 2022-05-03

## 2022-05-11 NOTE — REVIEW OF SYSTEMS
[Joint Pain] : joint pain [Negative] : Allergic/Immunologic [FreeTextEntry9] : Right elbow pain D/w Dr. Toure

## 2024-05-15 ENCOUNTER — APPOINTMENT (OUTPATIENT)
Dept: OBGYN | Facility: CLINIC | Age: 44
End: 2024-05-15

## 2024-05-20 ENCOUNTER — APPOINTMENT (OUTPATIENT)
Dept: OBGYN | Facility: CLINIC | Age: 44
End: 2024-05-20
Payer: MEDICAID

## 2024-05-20 VITALS
HEIGHT: 69 IN | BODY MASS INDEX: 22.96 KG/M2 | DIASTOLIC BLOOD PRESSURE: 80 MMHG | WEIGHT: 155 LBS | SYSTOLIC BLOOD PRESSURE: 110 MMHG

## 2024-05-20 DIAGNOSIS — Z30.432 ENCOUNTER FOR REMOVAL OF INTRAUTERINE CONTRACEPTIVE DEVICE: ICD-10-CM

## 2024-05-20 DIAGNOSIS — Z30.430 ENCOUNTER FOR INSERTION OF INTRAUTERINE CONTRACEPTIVE DEVICE: ICD-10-CM

## 2024-05-20 DIAGNOSIS — Z12.39 ENCOUNTER FOR OTHER SCREENING FOR MALIGNANT NEOPLASM OF BREAST: ICD-10-CM

## 2024-05-20 PROCEDURE — 76830 TRANSVAGINAL US NON-OB: CPT

## 2024-05-20 PROCEDURE — 81025 URINE PREGNANCY TEST: CPT

## 2024-05-20 PROCEDURE — 58300 INSERT INTRAUTERINE DEVICE: CPT

## 2024-05-20 PROCEDURE — 58301 REMOVE INTRAUTERINE DEVICE: CPT

## 2024-05-20 RX ORDER — LEVONORGESTREL 19.5 MG/1
INTRAUTERINE DEVICE INTRAUTERINE
Refills: 0 | Status: DISCONTINUED | COMMUNITY

## 2024-05-20 RX ORDER — LEVONORGESTREL 52 MG/1
20 INTRAUTERINE DEVICE INTRAUTERINE
Qty: 0 | Refills: 0 | Status: COMPLETED | OUTPATIENT
Start: 2024-05-20

## 2024-05-20 RX ADMIN — LEVONORGESTREL MCG/DAY: 52 INTRAUTERINE DEVICE INTRAUTERINE at 00:00

## 2024-05-20 NOTE — PROCEDURE
[Neg Pregnancy Test] : negative pregnancy test [IUD Removal] : intrauterine device (IUD) removal [Consent Obtained] : Consent obtained [ IUD] :  IUD [Risks] : risks [Benefits] : benefits [Alternatives] : alternatives [Speculum Placed] : speculum placed [IUD Removed - Forceps] : IUD removed - forceps [IUD Discarded] : IUD discarded [Tolerated Well] : Patient tolerated the procedure well [No Complications] : no complications [Locate IUD] : locate IUD [Transvaginal Ultrasound] : transvaginal ultrasound [Retroverted] : retroverted [L: ___ cm] : L: [unfilled] cm [W: ___cm] : W: [unfilled] cm [FreeTextEntry3] : IUD in place at fundus [FreeTextEntry7] : noted  small follicles [FreeTextEntry8] : not visualized [FreeTextEntry4] : IUD at fundus  Bulky uterus -heterogenous  [LMPDate] : 4/24/24

## 2024-05-21 LAB
C TRACH RRNA SPEC QL NAA+PROBE: NOT DETECTED
HCG UR QL: NEGATIVE
N GONORRHOEA RRNA SPEC QL NAA+PROBE: NOT DETECTED
QUALITY CONTROL: YES
SOURCE AMPLIFICATION: NORMAL

## 2024-06-13 DIAGNOSIS — Z98.82 BREAST IMPLANT STATUS: ICD-10-CM

## 2024-07-31 ENCOUNTER — APPOINTMENT (OUTPATIENT)
Dept: INTERNAL MEDICINE | Facility: CLINIC | Age: 44
End: 2024-07-31

## 2024-09-23 ENCOUNTER — APPOINTMENT (OUTPATIENT)
Dept: INTERNAL MEDICINE | Facility: CLINIC | Age: 44
End: 2024-09-23

## 2024-10-07 ENCOUNTER — RESULT CHARGE (OUTPATIENT)
Age: 44
End: 2024-10-07

## 2024-10-08 ENCOUNTER — NON-APPOINTMENT (OUTPATIENT)
Age: 44
End: 2024-10-08

## 2024-10-08 ENCOUNTER — APPOINTMENT (OUTPATIENT)
Dept: INTERNAL MEDICINE | Facility: CLINIC | Age: 44
End: 2024-10-08
Payer: MEDICAID

## 2024-10-08 ENCOUNTER — APPOINTMENT (OUTPATIENT)
Dept: CARDIOLOGY | Facility: CLINIC | Age: 44
End: 2024-10-08

## 2024-10-08 VITALS
RESPIRATION RATE: 16 BRPM | TEMPERATURE: 98.5 F | HEART RATE: 72 BPM | SYSTOLIC BLOOD PRESSURE: 136 MMHG | OXYGEN SATURATION: 97 % | HEIGHT: 69 IN | BODY MASS INDEX: 23.42 KG/M2 | DIASTOLIC BLOOD PRESSURE: 100 MMHG | WEIGHT: 158.13 LBS

## 2024-10-08 DIAGNOSIS — Z13.1 ENCOUNTER FOR SCREENING FOR DIABETES MELLITUS: ICD-10-CM

## 2024-10-08 DIAGNOSIS — J30.2 OTHER SEASONAL ALLERGIC RHINITIS: ICD-10-CM

## 2024-10-08 DIAGNOSIS — I10 ESSENTIAL (PRIMARY) HYPERTENSION: ICD-10-CM

## 2024-10-08 DIAGNOSIS — Z00.00 ENCOUNTER FOR GENERAL ADULT MEDICAL EXAMINATION W/OUT ABNORMAL FINDINGS: ICD-10-CM

## 2024-10-08 PROCEDURE — 93000 ELECTROCARDIOGRAM COMPLETE: CPT | Mod: 59

## 2024-10-08 PROCEDURE — 99213 OFFICE O/P EST LOW 20 MIN: CPT | Mod: 25

## 2024-10-08 PROCEDURE — 99396 PREV VISIT EST AGE 40-64: CPT

## 2024-10-08 RX ORDER — LORATADINE 10 MG/1
10 TABLET ORAL DAILY
Qty: 30 | Refills: 3 | Status: ACTIVE | COMMUNITY
Start: 2024-10-08 | End: 1900-01-01

## 2024-11-12 ENCOUNTER — LABORATORY RESULT (OUTPATIENT)
Age: 44
End: 2024-11-12

## 2024-11-19 ENCOUNTER — APPOINTMENT (OUTPATIENT)
Dept: INTERNAL MEDICINE | Facility: CLINIC | Age: 44
End: 2024-11-19

## 2024-11-19 ENCOUNTER — NON-APPOINTMENT (OUTPATIENT)
Age: 44
End: 2024-11-19

## 2024-11-19 VITALS — SYSTOLIC BLOOD PRESSURE: 140 MMHG | DIASTOLIC BLOOD PRESSURE: 90 MMHG

## 2024-11-19 VITALS
TEMPERATURE: 98.2 F | OXYGEN SATURATION: 97 % | SYSTOLIC BLOOD PRESSURE: 128 MMHG | WEIGHT: 159 LBS | HEIGHT: 69 IN | HEART RATE: 70 BPM | DIASTOLIC BLOOD PRESSURE: 84 MMHG | BODY MASS INDEX: 23.55 KG/M2 | RESPIRATION RATE: 16 BRPM

## 2024-11-19 DIAGNOSIS — F51.04 PSYCHOPHYSIOLOGIC INSOMNIA: ICD-10-CM

## 2024-11-19 PROCEDURE — G2211 COMPLEX E/M VISIT ADD ON: CPT | Mod: NC

## 2024-11-19 PROCEDURE — 99214 OFFICE O/P EST MOD 30 MIN: CPT

## 2024-11-19 RX ORDER — VALSARTAN 80 MG/1
80 TABLET, COATED ORAL
Qty: 90 | Refills: 0 | Status: ACTIVE | COMMUNITY
Start: 2024-11-19 | End: 1900-01-01

## 2024-11-19 RX ORDER — TRAZODONE HYDROCHLORIDE 50 MG/1
50 TABLET ORAL
Qty: 90 | Refills: 0 | Status: ACTIVE | COMMUNITY
Start: 2024-11-19 | End: 1900-01-01

## 2024-12-10 ENCOUNTER — APPOINTMENT (OUTPATIENT)
Dept: INTERNAL MEDICINE | Facility: CLINIC | Age: 44
End: 2024-12-10

## 2024-12-10 ENCOUNTER — APPOINTMENT (OUTPATIENT)
Dept: INTERNAL MEDICINE | Facility: CLINIC | Age: 44
End: 2024-12-10
Payer: MEDICAID

## 2024-12-10 VITALS
HEART RATE: 64 BPM | SYSTOLIC BLOOD PRESSURE: 128 MMHG | HEIGHT: 69 IN | OXYGEN SATURATION: 99 % | DIASTOLIC BLOOD PRESSURE: 84 MMHG | BODY MASS INDEX: 23.7 KG/M2 | WEIGHT: 160 LBS | RESPIRATION RATE: 16 BRPM | TEMPERATURE: 98.3 F

## 2024-12-10 PROBLEM — R63.5 WEIGHT GAIN: Status: ACTIVE | Noted: 2024-12-10

## 2024-12-10 PROBLEM — R63.2 INCREASED APPETITE: Status: ACTIVE | Noted: 2024-12-10

## 2024-12-10 PROCEDURE — 99214 OFFICE O/P EST MOD 30 MIN: CPT

## 2024-12-10 PROCEDURE — G2211 COMPLEX E/M VISIT ADD ON: CPT | Mod: NC

## 2025-01-06 ENCOUNTER — APPOINTMENT (OUTPATIENT)
Dept: ORTHOPEDIC SURGERY | Facility: CLINIC | Age: 45
End: 2025-01-06

## 2025-01-07 ENCOUNTER — APPOINTMENT (OUTPATIENT)
Dept: INTERNAL MEDICINE | Facility: CLINIC | Age: 45
End: 2025-01-07

## 2025-01-08 ENCOUNTER — APPOINTMENT (OUTPATIENT)
Dept: ORTHOPEDIC SURGERY | Facility: CLINIC | Age: 45
End: 2025-01-08

## 2025-01-16 ENCOUNTER — EMERGENCY (EMERGENCY)
Facility: HOSPITAL | Age: 45
LOS: 1 days | Discharge: HOME | End: 2025-01-16
Attending: EMERGENCY MEDICINE | Admitting: EMERGENCY MEDICINE
Payer: MEDICAID

## 2025-01-16 VITALS
TEMPERATURE: 98 F | WEIGHT: 149.91 LBS | DIASTOLIC BLOOD PRESSURE: 84 MMHG | OXYGEN SATURATION: 100 % | HEART RATE: 76 BPM | HEIGHT: 69 IN | SYSTOLIC BLOOD PRESSURE: 130 MMHG | RESPIRATION RATE: 14 BRPM

## 2025-01-16 LAB — HCG UR QL: NEGATIVE — SIGNIFICANT CHANGE UP

## 2025-01-16 PROCEDURE — 81025 URINE PREGNANCY TEST: CPT

## 2025-01-16 PROCEDURE — 99284 EMERGENCY DEPT VISIT MOD MDM: CPT | Mod: 25

## 2025-01-16 PROCEDURE — 99284 EMERGENCY DEPT VISIT MOD MDM: CPT

## 2025-01-16 PROCEDURE — 70450 CT HEAD/BRAIN W/O DYE: CPT | Mod: 26

## 2025-01-16 PROCEDURE — 70450 CT HEAD/BRAIN W/O DYE: CPT | Mod: MC

## 2025-01-16 PROCEDURE — 72125 CT NECK SPINE W/O DYE: CPT | Mod: 26

## 2025-01-16 PROCEDURE — 72125 CT NECK SPINE W/O DYE: CPT | Mod: MC

## 2025-01-16 RX ORDER — METHOCARBAMOL 500 MG
2 TABLET ORAL
Qty: 30 | Refills: 0
Start: 2025-01-16 | End: 2025-01-20

## 2025-01-16 RX ORDER — METHOCARBAMOL 500 MG
1500 TABLET ORAL ONCE
Refills: 0 | Status: DISCONTINUED | OUTPATIENT
Start: 2025-01-16 | End: 2025-01-16

## 2025-01-16 RX ORDER — LIDOCAINE 50 MG/G
1 OINTMENT TOPICAL ONCE
Refills: 0 | Status: DISCONTINUED | OUTPATIENT
Start: 2025-01-16 | End: 2025-01-16

## 2025-01-16 RX ORDER — OXYCODONE HCL 15 MG
5 TABLET ORAL ONCE
Refills: 0 | Status: DISCONTINUED | OUTPATIENT
Start: 2025-01-16 | End: 2025-01-16

## 2025-01-16 RX ORDER — IBUPROFEN 200 MG
1 TABLET ORAL
Qty: 15 | Refills: 0
Start: 2025-01-16 | End: 2025-01-20

## 2025-01-16 RX ORDER — ACETAMINOPHEN 80 MG/.8ML
650 SOLUTION/ DROPS ORAL ONCE
Refills: 0 | Status: DISCONTINUED | OUTPATIENT
Start: 2025-01-16 | End: 2025-01-16

## 2025-01-16 NOTE — ED PROVIDER NOTE - PHYSICAL EXAMINATION
SPINE: c-spine: supple, no spinal tenderness. no midline tenderness. no paraspinal tenderness. no body step off. no deformity. left muscle spasm. FROM neg nexus   Thoracic spine: no spinal tenderness. no midline tenderness. no paraspinal tenderness. no body step off. no deformity. no muscle spasm.FROM   LS-spine:no spinal tenderness. no midline tenderness. no paraspinal tenderness. no body step off. no deformity.no muscle spasm. FROM neg nexus from x 4. SENSATION GROSSLY INTACT X4. NEG STRAIGHT LEG RAISE. gait intact

## 2025-01-16 NOTE — ED PROVIDER NOTE - OBJECTIVE STATEMENT
Patient is a 44-year-old female with past medical history of hypertension presents with neck pain patient reports neck pain for very long time radiating to face.  Patient initially thought she had a toothache but that was ruled out.  Patient took muscle relaxant and ibuprofen which provided minimal relief.  Patient denies fever chest pain shortness of breath injury headache nausea vomiting

## 2025-01-16 NOTE — ED PROVIDER NOTE - CARE PROVIDER_API CALL
Reji Sandoval [facility]  Spine Surgery  [address], [address]  [place] [address]  Phone: [phone]  Fax: [phone]  Follow Up Time: 1-3 Days

## 2025-01-16 NOTE — ED PROVIDER NOTE - PATIENT PORTAL LINK FT
You can access the FollowMyHealth Patient Portal offered by [facility] by registering at the following website: [email or web address]. By joining [employer]’s FollowMyHealth portal, you will also be able to view your health information using other applications (apps) compatible with our system. negative You can access the FollowMyHealth Patient Portal offered by [facility] by registering at the following website: [email or web address]. By joining [employer]’s FollowMyHealth portal, you will also be able to view your health information using other applications (apps) compatible with our system.

## 2025-01-16 NOTE — ED ADULT TRIAGE NOTE - CHIEF COMPLAINT QUOTE
neck pain for 2 weeks. pain worse with turning side to side and pain  makes her nauseas  stopped b/p meds 1 month ago due to taking cold meds

## 2025-01-16 NOTE — ED PROVIDER NOTE - PROGRESS NOTE DETAILS
pt wants MRI advised we do not have a mri machine. offered ct pt does not want at this time pt unclear if she took ibuprofen or tylenol will rx robaxin and ibuprofen. will arrange for follow up coordinator to make follow up spine apt. will dc The referral coordinator was able to get the patient an appointment tomorrow with orthopedics but, the patient declined, wishes to keep her existing appointment. pt now wants ct ortho follow up refuses apt tomorrow. will follow up on her own. All imaging and labs reviewed. all results reviewed with pt including abnormal results. pt given a copy of results. pt advised to follow up with pmd regarding abnormal results. All questions answered and concerns addressed. pt verbalized understanding and agreement with plan and dx. pt advised on next step and when/where to follow up. pt advised on all take home and otc medications. pt advised to follow up with PMD. pt advised to return to ed for worsenng symptoms including fever, cp, sob. will dc.

## 2025-01-16 NOTE — ED PROVIDER NOTE - NSFOLLOWUPINSTRUCTIONS_ED_ALL_ED_FT
1. FOLLOW UP WITH YOUR DOCTOR IN 24-48 HOURS.  2. FOLLOW UP WITH ALL SPECIALIST DISCUSSED DURING YOUR VISIT.  3. TAKE IBUPROFEN 600MG EVERY 6 HOURS WITH FOOD AS NEEDED FOR PAIN.  4.IF PAIN PERSISTS, ADD ROBAXIN AS PRESCRIBED. DO NOT DRIVE WHEN TAKING ROBAXIN. RETURN FOR WORSENING SYMPTOMS.  5. USE WARM COMPRESS ON NECK AS NEEDED FOR PAIN  6. BUY OVER THE COUNTER SALONPAS LIDOCAINE PATCHES FOR NECK/ BACK PAIN AND USE AS NEEDED.     1) Follow-up with Orthopedics, See referred doctor. Call today / next business day for close, prompt follow-up.  2) Return to Emergency room for any worsening or persistent pain, weakness, numbness, fever, color change to extremity, or any other concerning symptoms.  3) See attached instruction sheets for additional information, including information regarding signs and symptoms to look out for, reasons to seek immediate care and other important instructions.   Back Pain    Back pain is very common in adults. The cause of back pain is rarely dangerous and the pain often gets better over time. The cause of your back pain may not be known and may include strain of muscles or ligaments, degeneration of the spinal disks, or arthritis. Occasionally the pain may radiate down your leg(s). Over-the-counter medicines to reduce pain and inflammation are often the most helpful. Stretching and remaining active frequently helps the healing process.     SEEK IMMEDIATE MEDICAL CARE IF YOU HAVE ANY OF THE FOLLOWING SYMPTOMS: bowel or bladder control problems, unusual weakness or numbness in your arms or legs, nausea or vomiting, abdominal pain, fever, dizziness/lightheadedness.

## 2025-01-16 NOTE — ED ADULT NURSE NOTE - NSFALLUNIVINTERV_ED_ALL_ED
Note addended, please fax to sara at the number on your desk   Bed/Stretcher in lowest position, wheels locked, appropriate side rails in place/Call bell, personal items and telephone in reach/Instruct patient to call for assistance before getting out of bed/chair/stretcher/Non-slip footwear applied when patient is off stretcher/[place] to call system/Physically safe environment - no spills, clutter or unnecessary equipment/Purposeful proactive rounding/Room/bathroom lighting operational, light cord in reach

## 2025-01-16 NOTE — ED PROVIDER NOTE - CLINICAL SUMMARY MEDICAL DECISION MAKING FREE TEXT BOX
44-year-old female with a history of hypertension presents with has a having some neck pain.  Patient states that she has had this neck pain for several months.  Patient states that she has some associated tingling in her face.  No fever or chills.  No numbness or tingling the arms or legs.  No acute headache.  No photophobia or neck stiffness.  No recent illness.  No chest pain or shortness of breath.  No aggravating or alleviating factors otherwise noted.  No other acute complaints.  The patient states that she has an appointment with [facility] in 2 weeks, but came to the ER today for evaluation.  Exam: Nontoxic, well-appearing.  Normal respiratory effort.  CTA BL, no W/R/R.  Neck supple.  No meningeal signs.  No focal spinal tenderness.  Nonfocal detailed neurologic exam.  NIHSS = 0.  No C/C/E.  No other acute findings on exam.  Chronic neck pain with occasional tingling in her face, otherwise neurologically intact at this time.  Patient does not want CT at this time.  Will follow-up with orthospine, meds.  Discussed with patient regarding neck pain precautions and instructions, neuro precautions, and importance of close prompt follow-up.

## 2025-02-03 ENCOUNTER — APPOINTMENT (OUTPATIENT)
Dept: ORTHOPEDIC SURGERY | Facility: CLINIC | Age: 45
End: 2025-02-03

## 2025-02-04 ENCOUNTER — APPOINTMENT (OUTPATIENT)
Dept: ORTHOPEDIC SURGERY | Facility: CLINIC | Age: 45
End: 2025-02-04

## 2025-02-17 ENCOUNTER — RX RENEWAL (OUTPATIENT)
Age: 45
End: 2025-02-17

## 2025-03-19 ENCOUNTER — APPOINTMENT (OUTPATIENT)
Dept: ORTHOPEDIC SURGERY | Facility: CLINIC | Age: 45
End: 2025-03-19

## 2025-04-30 ENCOUNTER — APPOINTMENT (OUTPATIENT)
Age: 45
End: 2025-04-30
Payer: COMMERCIAL

## 2025-04-30 VITALS
HEIGHT: 69 IN | BODY MASS INDEX: 23.7 KG/M2 | WEIGHT: 160 LBS | DIASTOLIC BLOOD PRESSURE: 80 MMHG | SYSTOLIC BLOOD PRESSURE: 120 MMHG

## 2025-04-30 DIAGNOSIS — N60.19 DIFFUSE CYSTIC MASTOPATHY OF UNSPECIFIED BREAST: ICD-10-CM

## 2025-04-30 DIAGNOSIS — Z01.419 ENCOUNTER FOR GYNECOLOGICAL EXAMINATION (GENERAL) (ROUTINE) W/OUT ABNORMAL FINDINGS: ICD-10-CM

## 2025-04-30 DIAGNOSIS — Z12.39 ENCOUNTER FOR OTHER SCREENING FOR MALIGNANT NEOPLASM OF BREAST: ICD-10-CM

## 2025-04-30 PROCEDURE — 99396 PREV VISIT EST AGE 40-64: CPT

## 2025-04-30 PROCEDURE — 99212 OFFICE O/P EST SF 10 MIN: CPT | Mod: 25

## 2025-05-02 DIAGNOSIS — N95.1 MENOPAUSAL AND FEMALE CLIMACTERIC STATES: ICD-10-CM

## 2025-05-02 LAB
24R-OH-CALCIDIOL SERPL-MCNC: 45.9 PG/ML
ESTRADIOL SERPL-MCNC: 18 PG/ML
FSH SERPL-MCNC: 9.4 IU/L
HPV HIGH+LOW RISK DNA PNL CVX: NOT DETECTED
LH SERPL-ACNC: 6.6 IU/L
T4 FREE SERPL-MCNC: 1.4 NG/DL
TSH SERPL-ACNC: 1.39 UIU/ML

## 2025-05-02 RX ORDER — ESTRADIOL 0.5 MG/.5G
0.5 GEL TOPICAL
Qty: 1 | Refills: 2 | Status: ACTIVE | COMMUNITY
Start: 2025-05-02 | End: 1900-01-01

## 2025-05-06 LAB — CYTOLOGY CVX/VAG DOC THIN PREP: ABNORMAL

## 2025-06-23 ENCOUNTER — NON-APPOINTMENT (OUTPATIENT)
Age: 45
End: 2025-06-23

## 2025-09-08 ENCOUNTER — APPOINTMENT (OUTPATIENT)
Dept: ORTHOPEDIC SURGERY | Facility: CLINIC | Age: 45
End: 2025-09-08
Payer: COMMERCIAL

## 2025-09-08 VITALS — HEIGHT: 69 IN | BODY MASS INDEX: 23.7 KG/M2 | WEIGHT: 160 LBS

## 2025-09-08 DIAGNOSIS — S93.409A SPRAIN OF UNSPECIFIED LIGAMENT OF UNSPECIFIED ANKLE, INITIAL ENCOUNTER: ICD-10-CM

## 2025-09-08 PROCEDURE — 99203 OFFICE O/P NEW LOW 30 MIN: CPT

## 2025-09-08 PROCEDURE — 73610 X-RAY EXAM OF ANKLE: CPT | Mod: RT

## 2025-09-08 RX ORDER — NAPROXEN 500 MG/1
500 TABLET ORAL
Qty: 30 | Refills: 1 | Status: ACTIVE | COMMUNITY
Start: 2025-09-08 | End: 1900-01-01

## 2025-09-09 ENCOUNTER — RX RENEWAL (OUTPATIENT)
Age: 45
End: 2025-09-09

## 2025-09-12 ENCOUNTER — EMERGENCY (EMERGENCY)
Facility: HOSPITAL | Age: 45
LOS: 1 days | End: 2025-09-12
Attending: STUDENT IN AN ORGANIZED HEALTH CARE EDUCATION/TRAINING PROGRAM | Admitting: STUDENT IN AN ORGANIZED HEALTH CARE EDUCATION/TRAINING PROGRAM
Payer: COMMERCIAL

## 2025-09-12 VITALS
TEMPERATURE: 99 F | WEIGHT: 154.98 LBS | DIASTOLIC BLOOD PRESSURE: 89 MMHG | RESPIRATION RATE: 15 BRPM | HEART RATE: 70 BPM | SYSTOLIC BLOOD PRESSURE: 127 MMHG | HEIGHT: 69 IN | OXYGEN SATURATION: 99 %

## 2025-09-12 PROCEDURE — 99283 EMERGENCY DEPT VISIT LOW MDM: CPT

## 2025-09-12 PROCEDURE — 99282 EMERGENCY DEPT VISIT SF MDM: CPT

## 2025-09-17 ENCOUNTER — APPOINTMENT (OUTPATIENT)
Dept: PAIN MANAGEMENT | Facility: CLINIC | Age: 45
End: 2025-09-17
Payer: COMMERCIAL

## 2025-09-17 VITALS — HEIGHT: 69 IN | WEIGHT: 160 LBS | BODY MASS INDEX: 23.7 KG/M2

## 2025-09-17 DIAGNOSIS — M47.812 SPONDYLOSIS W/OUT MYELOPATHY OR RADICULOPATHY, CERVICAL REGION: ICD-10-CM

## 2025-09-17 PROCEDURE — 99204 OFFICE O/P NEW MOD 45 MIN: CPT
